# Patient Record
Sex: MALE | Race: OTHER | Employment: FULL TIME | URBAN - METROPOLITAN AREA
[De-identification: names, ages, dates, MRNs, and addresses within clinical notes are randomized per-mention and may not be internally consistent; named-entity substitution may affect disease eponyms.]

---

## 2021-03-06 ENCOUNTER — HOSPITAL ENCOUNTER (EMERGENCY)
Facility: HOSPITAL | Age: 31
Discharge: HOME | End: 2021-03-06
Attending: EMERGENCY MEDICINE

## 2021-03-06 VITALS
HEIGHT: 70 IN | OXYGEN SATURATION: 98 % | BODY MASS INDEX: 25.44 KG/M2 | DIASTOLIC BLOOD PRESSURE: 76 MMHG | HEART RATE: 64 BPM | WEIGHT: 177.69 LBS | RESPIRATION RATE: 20 BRPM | SYSTOLIC BLOOD PRESSURE: 117 MMHG | TEMPERATURE: 97 F

## 2021-03-06 DIAGNOSIS — S61.412A LACERATION OF LEFT HAND WITHOUT FOREIGN BODY, INITIAL ENCOUNTER: Primary | ICD-10-CM

## 2021-03-06 PROCEDURE — 12001 RPR S/N/AX/GEN/TRNK 2.5CM/<: CPT

## 2021-03-06 PROCEDURE — 96372 THER/PROPH/DIAG INJ SC/IM: CPT

## 2021-03-06 PROCEDURE — 90471 IMMUNIZATION ADMIN: CPT

## 2021-03-06 PROCEDURE — 99283 EMERGENCY DEPT VISIT LOW MDM: CPT

## 2021-03-06 NOTE — ED INITIAL ASSESSMENT (HPI)
Pt came in for laceration to left hand. Reports cutting his hand with a knife at work today. CMS intact. Last tetanus unknown. RR even and nonlabored, speaking in full sentences, ambulatory with steady gait.

## 2021-03-06 NOTE — ED NOTES
Pt presents for laceration to left hand palm from knife while at work. Pt reports it has been many years since his last tetanus shot. No active bleeding noted. Pt reports normal sensation. CNS intact.

## 2021-03-06 NOTE — ED PROVIDER NOTES
Patient Seen in: [facility] AND [facility] Emergency Department      History   Patient presents with:  Laceration/Abrasion    Stated Complaint: Lac L hand    HPI/Subjective:   HPI    27year old male otherwise healthy who presents with laceration to his left womack Hands:       Cervical back: Normal range of motion and neck supple. No spinous process tenderness or muscular tenderness. Comments: Median, radial, and ulnar nerves intact. FDS and FDP with extension intact all fingers.       Skin:     Findings: No ra

## 2021-03-13 ENCOUNTER — HOSPITAL ENCOUNTER (EMERGENCY)
Facility: HOSPITAL | Age: 31
Discharge: HOME | End: 2021-03-13
Attending: EMERGENCY MEDICINE
Payer: OTHER MISCELLANEOUS

## 2021-03-13 VITALS
SYSTOLIC BLOOD PRESSURE: 132 MMHG | TEMPERATURE: 98 F | DIASTOLIC BLOOD PRESSURE: 69 MMHG | OXYGEN SATURATION: 97 % | RESPIRATION RATE: 16 BRPM | HEART RATE: 82 BPM

## 2021-03-13 DIAGNOSIS — Z48.02 ENCOUNTER FOR REMOVAL OF SUTURES: Primary | ICD-10-CM

## 2021-03-14 NOTE — ED NOTES
Patient to ER for removal of sutures to left hand edges well approximated no drainage or redness noted patient denies fever or s/s of infection
Negative

## 2021-03-14 NOTE — ED PROVIDER NOTES
Patient Seen in: [facility] AND [facility] Emergency Department      History   Patient presents with:  Sut Stap Nikolai    Stated Complaint: suture removal     HPI/Subjective:   HPI  26-year-old male with no significant past medical history here for suture r Mouth/Throat:      Mouth: Mucous membranes are moist.   Eyes:      Extraocular Movements: Extraocular movements intact. Cardiovascular:      Rate and Rhythm: Regular rhythm.    Pulmonary:      Effort: Pulmonary effort is normal.   Abdominal:      [place] Record Review: I personally reviewed available prior medical records for any recent pertinent discharge summaries, testing, and procedures, and reviewed those reports. Complicating Factors: The patient already has does not have a problem list on file.  t

## (undated) NOTE — LETTER
Date & Time: 3/6/2021, 8:28 AM  Patient: Cristal Tavarez  Encounter Provider(s):    Shanna Llamas MD       Occupational restrictions  For: Cristal Tavarez    No lifting objects greater than 10 lbs  wear glove at work and cover lacreation at all times  Nor